# Patient Record
Sex: FEMALE | Race: WHITE | NOT HISPANIC OR LATINO | Employment: UNEMPLOYED | URBAN - METROPOLITAN AREA
[De-identification: names, ages, dates, MRNs, and addresses within clinical notes are randomized per-mention and may not be internally consistent; named-entity substitution may affect disease eponyms.]

---
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
## 2024-08-05 ENCOUNTER — TELEPHONE (OUTPATIENT)
Age: 19
End: 2024-08-05

## 2024-08-05 NOTE — TELEPHONE ENCOUNTER
Pts grandmother called and asked if Dr. Lira can please call the pt back, she had some questions [phone]. Thank you.

## 2024-08-06 NOTE — TELEPHONE ENCOUNTER
Pt returning callback. No answer when attempting to contact the practice. Pt states original call from grandmother was in regards to Dr. Smart seeing her 1 year old son [address] to [address], she had some questions. Please call back at [phone], pt will be at work at 10 and is unable to answer phone after that.

## 2025-05-23 ENCOUNTER — OFFICE VISIT (OUTPATIENT)
Dept: URGENT CARE | Facility: CLINIC | Age: 20
End: 2025-05-23
Payer: COMMERCIAL

## 2025-05-23 VITALS
SYSTOLIC BLOOD PRESSURE: 118 MMHG | HEIGHT: 64 IN | TEMPERATURE: 97.2 F | HEART RATE: 94 BPM | DIASTOLIC BLOOD PRESSURE: 78 MMHG | OXYGEN SATURATION: 98 % | WEIGHT: 115 LBS | RESPIRATION RATE: 16 BRPM | BODY MASS INDEX: 19.63 KG/M2

## 2025-05-23 DIAGNOSIS — N39.0 URINARY TRACT INFECTION WITH HEMATURIA, SITE UNSPECIFIED: Primary | ICD-10-CM

## 2025-05-23 DIAGNOSIS — R31.9 URINARY TRACT INFECTION WITH HEMATURIA, SITE UNSPECIFIED: Primary | ICD-10-CM

## 2025-05-23 LAB
SL AMB  POCT GLUCOSE, UA: ABNORMAL
SL AMB LEUKOCYTE ESTERASE,UA: ABNORMAL
SL AMB POCT BILIRUBIN,UA: ABNORMAL
SL AMB POCT BLOOD,UA: ABNORMAL
SL AMB POCT CLARITY,UA: ABNORMAL
SL AMB POCT COLOR,UA: YELLOW
SL AMB POCT KETONES,UA: ABNORMAL
SL AMB POCT NITRITE,UA: NEGATIVE
SL AMB POCT PH,UA: 5
SL AMB POCT SPECIFIC GRAVITY,UA: 1.01
SL AMB POCT URINE HCG: NORMAL
SL AMB POCT URINE PROTEIN: ABNORMAL
SL AMB POCT UROBILINOGEN: NORMAL

## 2025-05-23 PROCEDURE — 87077 CULTURE AEROBIC IDENTIFY: CPT | Performed by: PHYSICIAN ASSISTANT

## 2025-05-23 PROCEDURE — 99213 OFFICE O/P EST LOW 20 MIN: CPT | Performed by: PHYSICIAN ASSISTANT

## 2025-05-23 PROCEDURE — 81002 URINALYSIS NONAUTO W/O SCOPE: CPT | Performed by: PHYSICIAN ASSISTANT

## 2025-05-23 PROCEDURE — 81025 URINE PREGNANCY TEST: CPT | Performed by: PHYSICIAN ASSISTANT

## 2025-05-23 PROCEDURE — 87086 URINE CULTURE/COLONY COUNT: CPT | Performed by: PHYSICIAN ASSISTANT

## 2025-05-23 RX ORDER — NITROFURANTOIN 25; 75 MG/1; MG/1
100 CAPSULE ORAL 2 TIMES DAILY
Qty: 14 CAPSULE | Refills: 0 | Status: SHIPPED | OUTPATIENT
Start: 2025-05-23 | End: 2025-05-30

## 2025-05-23 NOTE — LETTER
May 23, 2025     Patient: Pebbles Robertson   YOB: 2005   Date of Visit: 5/23/2025       To Whom it May Concern:    Pebbles Robertson was seen in my clinic on 5/23/2025. She may return to work on 05/25/2025.    If you have any questions or concerns, please don't hesitate to call.         Sincerely,          Jay Carranza Jr, PABharatiC        CC: No Recipients

## 2025-05-23 NOTE — PROGRESS NOTES
"[facility]        NAME: Pebbles Robertson is a 20 y.o. female  : 2005    MRN: [id number]  DATE: May 23, 2025  TIME: 3:50 PM    /78   Pulse 94   Temp (!) 97.2 °F (36.2 °C)   Resp 16   Ht 5' 3.5\" (1.613 m)   Wt 52.2 kg (115 lb)   SpO2 98%   BMI 20.05 kg/m²     Assessment and Plan   Urinary tract infection with hematuria, site unspecified [N39.0, R31.9]  1. Urinary tract infection with hematuria, site unspecified  POCT urine dip    POCT urine HCG    Urine culture    Urine culture    nitrofurantoin (MACROBID) 100 mg capsule            Patient Instructions       Follow up with PCP in 3-5 days.  Proceed to  ER if symptoms worsen.    Chief Complaint     Chief Complaint   Patient presents with    Possible UTI     Still having symptoms after finishing rx 2 days ago;   States she can barely walk around that the pain is so bad;   Bloating; pain with urination   Needs note for work          History of Present Illness       Pt with continued pelvis pain and pressure  sometimes severe not now, constant feeling of having to urinate         Review of Systems   Review of Systems   Constitutional: Negative.    Eyes: Negative.    Respiratory: Negative.     Cardiovascular: Negative.    Gastrointestinal: Negative.    Endocrine: Negative.    Genitourinary:  Positive for dysuria and pelvic pain.   Musculoskeletal: Negative.    Skin: Negative.    Allergic/Immunologic: Negative.    Neurological: Negative.    Hematological: Negative.    Psychiatric/Behavioral: Negative.     All other systems reviewed and are negative.        Current Medications     Current Medications[1]    Current Allergies     Allergies as of 2025    (No Known Allergies)            The following portions of the patient's history were reviewed and updated as appropriate: allergies, current medications, past family history, past medical history, past social history, past surgical history and problem list.     Past Medical History[2]    Past " "Surgical History[3]    Family History[4]      Medications have been verified.        Objective   /78   Pulse 94   Temp (!) 97.2 °F (36.2 °C)   Resp 16   Ht 5' 3.5\" (1.613 m)   Wt 52.2 kg (115 lb)   SpO2 98%   BMI 20.05 kg/m²        Physical Exam     Physical Exam  Vitals and nursing note reviewed.   Constitutional:       Appearance: Normal appearance. She is normal weight.      Comments: Discussed with pt if severe pain will go back to er    HENT:      Head: Normocephalic and atraumatic.      Right Ear: Tympanic membrane, ear canal and external ear normal.      Left Ear: Tympanic membrane, ear canal and external ear normal.      Nose: Nose normal.      Mouth/Throat:      Mouth: Mucous membranes are moist.     Eyes:      Extraocular Movements: Extraocular movements intact.      Conjunctiva/sclera: Conjunctivae normal.      Pupils: Pupils are equal, round, and reactive to light.       Cardiovascular:      Rate and Rhythm: Normal rate and regular rhythm.      Pulses: Normal pulses.      Heart sounds: Normal heart sounds.   Pulmonary:      Effort: Pulmonary effort is normal.      Breath sounds: Normal breath sounds.   Abdominal:      Palpations: Abdomen is soft.      Comments: Minor suiprapubic pressure in office now   No rlq  No llq pain   No midepigastric pain no ruq no luq no periumbilical pain      Musculoskeletal:         General: Normal range of motion.      Cervical back: Normal range of motion and neck supple.     Skin:     General: Skin is warm.      Capillary Refill: Capillary refill takes less than 2 seconds.     Neurological:      Mental Status: She is alert and oriented to person, place, and time.                          [1]   Current Outpatient Medications:     nitrofurantoin (MACROBID) 100 mg capsule, Take 1 capsule (100 mg total) by mouth 2 (two) times a day for 7 days, Disp: 14 capsule, Rfl: 0    cholecalciferol (VITAMIN D3) 1,000 units tablet, Take by mouth (Patient not taking: Reported " on 5/23/2025), Disp: , Rfl:     Multiple Vitamins tablet, Take by mouth (Patient not taking: Reported on 5/23/2025), Disp: , Rfl:     naproxen (NAPROSYN) 250 mg tablet, Take 1 tablet (250 mg total) by mouth 3 (three) times a day with meals for 7 days (Patient not taking: Reported on 5/23/2025), Disp: 21 tablet, Rfl: 0    Prenatal Vit-Fe Fumarate-FA (PRENATAL PO), Take 1 capsule by mouth in the morning (Patient not taking: Reported on 5/23/2025), Disp: , Rfl:   [2]   Past Medical History:  Diagnosis Date    Known health problems: none    [3]   Past Surgical History:  Procedure Laterality Date    NO PAST SURGERIES     [4]   Family History  Adopted: Yes   Family history unknown: Yes

## 2025-05-23 NOTE — LETTER
May 23, 2025     Patient: Pebbles Robertson   YOB: 2005   Date of Visit: 5/23/2025       To Whom It May Concern:    It is my medical opinion that Pebbles Robertson may return to work on 5/24/2025.    If you have any questions or concerns, please don't hesitate to call.         Sincerely,        Jay Carranza Jr, PA-C    CC: No Recipients

## 2025-05-24 ENCOUNTER — RESULTS FOLLOW-UP (OUTPATIENT)
Dept: URGENT CARE | Facility: CLINIC | Age: 20
End: 2025-05-24

## 2025-05-25 LAB
BACTERIA UR CULT: ABNORMAL
BACTERIA UR CULT: ABNORMAL